# Patient Record
(demographics unavailable — no encounter records)

---

## 2025-04-11 NOTE — DISCUSSION/SUMMARY
[de-identified] : modify activities try OTC meds ice as needed try topical lidocaine for pain control reviewed current medications used by this patient home exercises for functional return will try TPI today   RE:  VIOLET QUIROZ   Acct #- [id number]   Attention:  Nurse Reviewer /Medical Director    Based on my patient's condition, I strongly believe that the MRI  C Spine is medically.necessary.   The patient has failed oral meds, injections and PT and conservative treatment in combination or by themselves and therefore needs the MRI.   The MRI will dictate further treatment t recommendations. spine eval

## 2025-04-11 NOTE — PROCEDURE
[Trigger point 1-2 muscle groups] : trigger point 1-2 muscle groups [Left] : of the left [Cervical paraspinal muscle] : cervical paraspinal muscle [Inflammation] : inflammation [Betadine] : betadine [___ cc    3mg] :  Betamethasone (Celestone) ~Vcc of 3mg [___ cc    1%] : Lidocaine ~Vcc of 1%  [___ cc    0.25%] : Bupivacaine (Marcaine) ~Vcc of 0.25%  [] : Patient tolerated procedure well [Call if redness, pain or fever occur] : call if redness, pain or fever occur [Apply ice for 15min out of every hour for the next 12-24 hours as tolerated] : apply ice for 15 minutes out of every hour for the next 12-24 hours as tolerated [Patient was advised to rest the joint(s) for ____ days] : patient was advised to rest the joint(s) for [unfilled] days

## 2025-04-11 NOTE — HISTORY OF PRESENT ILLNESS
[Gradual] : gradual [4] : 4 [Radiating] : radiating [Work] : work [Sleep] : sleep [Lying in bed] : lying in bed [de-identified] : 47 year old female with pain in the left arm,. starts in the neck and radiates all the way down the left arm. She has a chronic history of cervical pathology, she has treated for left shoulder tendinitis in the past. In 2023 she completed ADI with Dr. Nix and had mild relief with that. Symptoms started to get worse a couple weeks ago,walking around with her arm over head helps to alleviate some of the pain.  [] : no [FreeTextEntry1] : l shoulder [FreeTextEntry5] : chronic shoulder and neck pain, has been  seen  in the past  [FreeTextEntry6] : numbness top of hand , pins and needles [FreeTextEntry7] : neck down arm [FreeTextEntry9] : Aeve [de-identified] : certain positions  [de-identified] : [employer] [de-identified] : [employer]

## 2025-04-11 NOTE — PHYSICAL EXAM
[Straightening consistent with spasm] : Straightening consistent with spasm [Disc space narrowing] : Disc space narrowing [Left] : left shoulder [TWNoteComboBox7] : forward flexion 30 degrees [] : no tenderness to palpation

## 2025-05-04 NOTE — PHYSICAL EXAM
[Normal Coordination] : normal coordination [Normal DTR UE/LE] : normal DTR UE/LE  [Normal Sensation] : normal sensation [Normal Mood and Affect] : normal mood and affect [Oriented] : oriented [Able to Communicate] : able to communicate [Normal Skin] : normal skin [No Rash] : no rash [No Ulcers] : no ulcers [No Lesions] : no lesions [No obvious lymphadenopathy in areas examined] : no obvious lymphadenopathy in areas examined [Well Developed] : well developed [Well Nourished] : well nourished [Peripheral vascular exam is grossly normal] : peripheral vascular exam is grossly normal [No Respiratory Distress] : no respiratory distress [Lungs clear to auscultation bilaterally] : lungs clear to auscultation bilaterally [Normal Bowel Sounds] : normal bowel sounds [Non-Tender] : non-tender [No HSM] : no HSM [No Mass] : no mass [NL (45)] : right lateral flexion 45 degrees [NL (80)] : right lateral rotation 80 degrees [5___] : right grasp 5[unfilled]/5 [Biceps 2+] : biceps 2+ [Triceps 2+] : triceps 2+ [Brachioradialis 2+] : brachioradialis 2+ [] : no rhomboid tenderness

## 2025-05-04 NOTE — DATA REVIEWED
[FreeTextEntry1] : On my interpretation of these images & reports [facility] MRI Cervical 04/18/2025 Left C6/7 HNP with neve compression. Smaller C5/6 HNP   I stop paperwork reviewed Ortho progress notes reviewed

## 2025-05-04 NOTE — DISCUSSION/SUMMARY
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 Patient reports reluctances toward ADI and does not wish to proceed. Will remain conservative at this time. More urgency for surgery (ACDF) if there is progression of motor or neurological deficits or radicular symptoms remain refractory to non operative mgmt.. Follow up PRN.    Prior to appointment and during encounter with patient extensive medical records were reviewed including but not limited to, hospital records, outpatient records, imaging results, and lab data. During this appointment the patient was examined, diagnoses were discussed and explained in a face to face manner. In addition extensive time was spent reviewing aforementioned diagnostic studies. Counseling including abnormal image results, differential diagnoses, treatment options, risk and benefits, lifestyle changes, current condition, and current medications was performed. Patient's comments, questions, and concerns were addressed and patient verbalized understanding. Based on this patient's presentation at our office, which is an orthopedic spine surgeon's office, this patient inherently / intrinsically has a risk, however minute, of developing issues such as Cauda equina syndrome, bowel and bladder changes, or progression of motor or neurological deficits such as paralysis which may be permanent.   HINA LEO Acting as a Scribe for Dr. Marcell KANG, Hina Leo, attest that this documentation has been prepared under the direction and in the presence of Provider Meliton Faith MD.

## 2025-05-04 NOTE — HISTORY OF PRESENT ILLNESS
[de-identified] : 05/02/2025 - 47 Y F presents for initial evaluation with chief complaint of neck and LUE radicular pain for several weeks without injury. No trauma or injury.  She has a chronic history of cervical pathology; & has history of left shoulder tendinitis. Started cyclobenzaprine and Meloxicam few weeks ago. Received trigger point injection and states her current pain has significantly improved. There is no UE weakness, change in dexterity, or difficulties with gait/balance.   Injury Details: Details of Accident/Injury: chronic shoulder and neck pain, has been seen in the past.   Injury was gradual.   On a scale of 0-10, patient rated 4 for pain when active.   On a scale of 0-10, patient rated 4 for pain at rest.   Pain Quality: radiating . numbness top of hand , pins and needles.   Pain is Radiating: yes. neck down arm   Pain affects the following activities: work, sleep.   Pain improves with:. Aeve.   Pain worsen with: lying in bed . certain positions.   Imaging: [facility] MRI Cervical 04/18/2025 Worker's compensation injury: no.   Occupation: [employer].

## 2025-05-04 NOTE — HISTORY OF PRESENT ILLNESS
[de-identified] : 05/02/2025 - 47 Y F presents for initial evaluation with chief complaint of neck and LUE radicular pain for several weeks without injury. No trauma or injury.  She has a chronic history of cervical pathology; & has history of left shoulder tendinitis. Started cyclobenzaprine and Meloxicam few weeks ago. Received trigger point injection and states her current pain has significantly improved. There is no UE weakness, change in dexterity, or difficulties with gait/balance.   Injury Details: Details of Accident/Injury: chronic shoulder and neck pain, has been seen in the past.   Injury was gradual.   On a scale of 0-10, patient rated 4 for pain when active.   On a scale of 0-10, patient rated 4 for pain at rest.   Pain Quality: radiating . numbness top of hand , pins and needles.   Pain is Radiating: yes. neck down arm   Pain affects the following activities: work, sleep.   Pain improves with:. Aeve.   Pain worsen with: lying in bed . certain positions.   Imaging: [facility] MRI Cervical 04/18/2025 Worker's compensation injury: no.   Occupation: [employer].

## 2025-05-04 NOTE — DISCUSSION/SUMMARY
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 Patient reports reluctances toward ADI and does not wish to proceed. Will remain conservative at this time. More urgency for surgery (ACDF) if there is progression of motor or neurological deficits or radicular symptoms remain refractory to non operative mgmt.. Follow up PRN.    Prior to appointment and during encounter with patient extensive medical records were reviewed including but not limited to, hospital records, outpatient records, imaging results, and lab data. During this appointment the patient was examined, diagnoses were discussed and explained in a face to face manner. In addition extensive time was spent reviewing aforementioned diagnostic studies. Counseling including abnormal image results, differential diagnoses, treatment options, risk and benefits, lifestyle changes, current condition, and current medications was performed. Patient's comments, questions, and concerns were addressed and patient verbalized understanding. Based on this patient's presentation at our office, which is an orthopedic spine surgeon's office, this patient inherently / intrinsically has a risk, however minute, of developing issues such as Cauda equina syndrome, bowel and bladder changes, or progression of motor or neurological deficits such as paralysis which may be permanent.   HINA LEO Acting as a Scribe for Dr. Marcell KANG, Hina Leo, attest that this documentation has been prepared under the direction and in the presence of Provider Meliton Faith MD.

## 2025-05-04 NOTE — PHYSICAL EXAM
[Normal Coordination] : normal coordination [Normal DTR UE/LE] : normal DTR UE/LE  [Normal Sensation] : normal sensation [Normal Mood and Affect] : normal mood and affect [Oriented] : oriented [Able to Communicate] : able to communicate [Normal Skin] : normal skin [No Rash] : no rash [No Ulcers] : no ulcers [No Lesions] : no lesions [No obvious lymphadenopathy in areas examined] : no obvious lymphadenopathy in areas examined [Well Developed] : well developed [Well Nourished] : well nourished [Peripheral vascular exam is grossly normal] : peripheral vascular exam is grossly normal [No Respiratory Distress] : no respiratory distress [Lungs clear to auscultation bilaterally] : lungs clear to auscultation bilaterally [Normal Bowel Sounds] : normal bowel sounds [Non-Tender] : non-tender [No HSM] : no HSM [No Mass] : no mass [NL (45)] : right lateral flexion 45 degrees [NL (80)] : right lateral rotation 80 degrees [5___] : right grasp 5[unfilled]/5 [Biceps 2+] : biceps 2+ [Triceps 2+] : triceps 2+ [Brachioradialis 2+] : brachioradialis 2+ [] : no rhomboid tenderness 88.5